# Patient Record
Sex: FEMALE | Race: WHITE | NOT HISPANIC OR LATINO | Employment: PART TIME
[De-identification: names, ages, dates, MRNs, and addresses within clinical notes are randomized per-mention and may not be internally consistent; named-entity substitution may affect disease eponyms.]

---

## 2024-01-17 ENCOUNTER — TRANSCRIBE ORDERS (OUTPATIENT)
Dept: OTHER | Age: 54
End: 2024-01-17

## 2024-01-17 DIAGNOSIS — G60.0 CHARCOT-MARIE-TOOTH DISEASE: Primary | ICD-10-CM

## 2024-03-10 ENCOUNTER — HEALTH MAINTENANCE LETTER (OUTPATIENT)
Age: 54
End: 2024-03-10

## 2024-04-01 ENCOUNTER — PRE VISIT (OUTPATIENT)
Dept: NEUROLOGY | Facility: CLINIC | Age: 54
End: 2024-04-01
Payer: COMMERCIAL

## 2024-04-01 DIAGNOSIS — G60.9 HEREDITARY AND IDIOPATHIC PERIPHERAL NEUROPATHY: Primary | ICD-10-CM

## 2024-04-01 NOTE — TELEPHONE ENCOUNTER
"CMT NEW PATIENT  Do you have any other family members that have been diagnosed with CMT? Yes, two of my daughters  When did your symptoms start? \"They probably started when I was young. But I never knew I had it until my youngest jaci started having issues with her balance. We took her in and that's how I found out I had it. Looking back I was pretty clumsy and couldn't run as a kid\".  Who initially diagnosed you with CMT? [facility] in [place] in 2006. Dr. Anish Marcos  Has genetic testing been completed? If so, do you know what type you have? No, but children are type CMT1A  Do you have hammertoes? yes  Do you have high Arches? \"No, I have flat feet actually\"  Sensory Loss         - Do you have loss of feeling anywhere in your feet or legs? Numbness in bilateral feet         - If so, does the loss of feeling extend above your toes? yes         - Does it extend above the ankle? Not that I've noticed         - Can you identify the point where the sensation becomes normal or nearly normal? At the ankle sensation becomes more normal  - Are these symptoms constant (present all the time), present most of the daytime, less than one-half of the daytime, or just occasionally (Daytime is defined as the time between getting up and going to bed)? Comes and goes but no pattern    Motor Symptoms- Legs  - Do you have weakness in your legs or feet? Yes BLE  - Do you ever trip over your toes/feet or turn or sprain your ankles? \"Yes definitely, but I've become more conscious of it as I've gotten older.\"  - Do your feet slap down on the ground when you walk? yea  - Do you wear shoe inserts/insoles (below the ankle)? no  - Do you wear braces, splints or an equivalent type of orthotic that extends above your ankle? no If so, how are these working for you?   - Have the above ankle orthotics described above ever been prescribed or suggested by a healthcare professional? N/A  - Have you ever had " "surgery on your feet or ankles? Two surgeries on each foot for bunions. Right big toe had bones that were fused together  - If so, do you know if the surgery involved fusion of bones, a transfer of tendons, heel cord lengthening or lowering of the arch? yes  - Do you use a cane, walking stick, or walker to help you walk most of the time outside the home? No. If not, do you feel adaptive equipment would be beneficial? No  - Do you use a wheelchair most of the time because of weakness? No  Motor Symptoms- Arms  - Do you have difficulty with buttoning clothes (standard shirt buttons)? For the most part no, some days are more difficult than others   - If yes, are the difficulties mild or severe (severe includes unable)? mild  - Can you cut most food including meat and pizza with normal utensils? \"Yes, but I have to put more effort into gripping as I've gotten older\"  - Do you have difficulty with activities that require extending or flexing your arms, or activities using your upper arms? no         - Do you have any difficulty with gripping or pinching objects? no         - Do you currently have any hand splints that you wear or have these ever been used in the past (If yes, please ask that they bring these to the appt)? 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 yea  There will also be a  available. Would you be interested in discussing any of the following topics: not at this time   - Initiating the disability process  -Transportation issues  -Financial concerns   - Other community resources  Records needed:   Previous EMG report: Had an EMG done at [facility] in [place] in 2006. Dr. Anish Marcos. She will bring in a copy at her appointment.   Genetic test results if completed: N/A  Office visit notes from previous neurologists " that pertain to CMT: She will bring with her to the appointment   What to expect during your visit:    The CMT clinic takes place every 2nd Tuesday of the month. It is a multi-disciplinary clinic where the pt sees multiple specialists during their visit. The team consists of the neurologist, physical therapist, occupational therapist, orthotist, genetic counselor, , research coordinator and a representative from the [employer] .   If they are scheduled in the morning they will need to arrive between 8-9am. Their departure time will vary but could be as late at 12PM.  If they are scheduled in the afternoon, we will need them to arrive between 12-1pm. Their departure time will vary but could be as late as 5PM.  We will be in touch with you closer to the appt date to update your chart and complete a pre-visit .       RECAP OF SERVICES NEEDED: Research, genetics, PT, orthotics

## 2024-04-09 ENCOUNTER — OFFICE VISIT (OUTPATIENT)
Dept: NEUROLOGY | Facility: CLINIC | Age: 54
End: 2024-04-09
Payer: COMMERCIAL

## 2024-04-09 ENCOUNTER — THERAPY VISIT (OUTPATIENT)
Dept: PHYSICAL THERAPY | Facility: CLINIC | Age: 54
End: 2024-04-09
Attending: FAMILY MEDICINE
Payer: COMMERCIAL

## 2024-04-09 VITALS
HEIGHT: 63 IN | BODY MASS INDEX: 23.92 KG/M2 | HEART RATE: 60 BPM | WEIGHT: 135 LBS | DIASTOLIC BLOOD PRESSURE: 87 MMHG | SYSTOLIC BLOOD PRESSURE: 158 MMHG

## 2024-04-09 DIAGNOSIS — G60.9 HEREDITARY AND IDIOPATHIC PERIPHERAL NEUROPATHY: ICD-10-CM

## 2024-04-09 DIAGNOSIS — Z71.83 ENCOUNTER FOR NONPROCREATIVE GENETIC COUNSELING: ICD-10-CM

## 2024-04-09 DIAGNOSIS — G60.9 HEREDITARY AND IDIOPATHIC PERIPHERAL NEUROPATHY: Primary | ICD-10-CM

## 2024-04-09 DIAGNOSIS — Z86.39 HX OF NON ANEMIC VITAMIN B12 DEFICIENCY: Primary | ICD-10-CM

## 2024-04-09 PROCEDURE — 97116 GAIT TRAINING THERAPY: CPT | Mod: GP | Performed by: PHYSICAL THERAPIST

## 2024-04-09 PROCEDURE — 99204 OFFICE O/P NEW MOD 45 MIN: CPT | Mod: GC | Performed by: PSYCHIATRY & NEUROLOGY

## 2024-04-09 PROCEDURE — 83921 ORGANIC ACID SINGLE QUANT: CPT | Performed by: PSYCHIATRY & NEUROLOGY

## 2024-04-09 PROCEDURE — 99000 SPECIMEN HANDLING OFFICE-LAB: CPT | Performed by: PSYCHIATRY & NEUROLOGY

## 2024-04-09 PROCEDURE — 82607 VITAMIN B-12: CPT | Performed by: PSYCHIATRY & NEUROLOGY

## 2024-04-09 PROCEDURE — 36415 COLL VENOUS BLD VENIPUNCTURE: CPT | Performed by: PSYCHIATRY & NEUROLOGY

## 2024-04-09 PROCEDURE — 97161 PT EVAL LOW COMPLEX 20 MIN: CPT | Mod: GP | Performed by: PHYSICAL THERAPIST

## 2024-04-09 PROCEDURE — 96040 PR GENETIC COUNSELING, EACH 30 MIN: CPT | Performed by: GENETIC COUNSELOR, MS

## 2024-04-09 RX ORDER — CETIRIZINE HYDROCHLORIDE 10 MG/1
1 TABLET ORAL DAILY
COMMUNITY
Start: 2023-06-02

## 2024-04-09 RX ORDER — NORGESTIMATE AND ETHINYL ESTRADIOL 0.25-0.035
1 KIT ORAL DAILY
COMMUNITY

## 2024-04-09 RX ORDER — MULTIVITAMIN WITH IRON
1 TABLET ORAL DAILY
COMMUNITY

## 2024-04-09 RX ORDER — ATENOLOL 100 MG/1
1 TABLET ORAL DAILY
COMMUNITY
Start: 2023-06-02

## 2024-04-09 RX ORDER — NICOTINE POLACRILEX 4 MG/1
GUM, CHEWING ORAL
COMMUNITY

## 2024-04-09 RX ORDER — SENNOSIDES A AND B 8.6 MG/1
1 TABLET, FILM COATED ORAL DAILY PRN
COMMUNITY

## 2024-04-09 NOTE — Clinical Note
Orthotics order revised, ready for review & sign.   Also, per PT patient was complaining of 6/10 neck and R-shoulder pain. PT suggested an ortho consult. If appropriate, would you mind ordering this as well?  Thanks,  Aruna

## 2024-04-09 NOTE — LETTER
2024         RE: Faith Pruett  [address] [facility] [address]        Dear Colleague,    Thank you for referring your patient, Faith Pruett, to the [facility] NEUROLOGY [facility]IC [place]. Please see a copy of my visit note below.    [facility]  Neuromuscular Consultation Note  CMT Certified Center of Excellence    Patient Name:  Faith Pruett  MRN:  [id number]      :  1970  Date of Service:  2024  Primary care provider:  Solange Domínguez      HISTORY OF PRESENT ILLNESS:   Ms. 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 She has a longstanding history of clumsiness but otherwise was functionally capable throughout most of life. She has 2 daughters with poor balance and coordination and had genetic testing showing CMT1A.  She is here to establish care.    She had an EMG several years ago which showed universal very slow conduction velocities. As above, she has 2 daughters that have poor balance and coordination and were tested for hereditary etiologies and were found to have CMT1A. In hindsight, she noticed clumsiness, difficulty in sports, and frequent falls throughout her childhood adult life.  Should be noted, she has 1 daughter that is not clumsy and better at sports than her siblings and does not have CMT.    She currently is noticing numbness in the toes and foot up to this level of the ankles or becomes more normal.  Her hands also fall asleep at night and cause some numbness, but this is inconsistent.  She does report some loss of dexterity of her fingertips with difficulty with buttons, but this is mild.  She is still able to make jewelry and do other fine motor tasks with her hands with only minor complications.  She does endorse some  strength loss over the course of years.  Otherwise she has  ankle instability and foot weakness, but occasionally her knees will also buckle.  She has some minor difficulty with stairs, but she does not avoid them and feels like she can ultimately do them.  She does not need any gait aids or devices at this time.  She is cautious and does not fall.  She has 2 previous surgeries on each of her feet for bunions and fusion of first metatarsal joints with good success.  Finally, she does endorse cramping of the calves and feet at night and has tried some tonic water but has not tried magnesium until just the last few days.  She may want more stronger medication for this and will let us know.      PMH:  No past medical history on file.  No past surgical history on file.    MEDs:   Current Outpatient Medications   Medication Sig Dispense Refill     atenolol (TENORMIN) 100 MG tablet Take 1 tablet by mouth daily       cetirizine (ZYRTEC) 10 MG tablet Take 1 tablet by mouth daily       ferrous sulfate (SLO-FE) 142 (45 Fe) MG CR tablet Take 142 mg by mouth daily       magnesium 250 MG tablet Take 1 tablet by mouth daily 250 Mg       norgestimate-ethinyl estradiol (ORTHO-CYCLEN) 0.25-35 MG-MCG tablet Take 1 tablet by mouth daily       omeprazole 20 MG tablet        senna (SENOKOT) 8.6 MG tablet Take 1 tablet by mouth daily as needed 3 times a day / 1 capsule       No current facility-administered medications for this visit.                  ALLERGIES:    Allergies   Allergen Reactions     Sulfa Antibiotics Hives, Itching and Rash       Social Hx:  Social History     Socioeconomic History     Marital status: Single     Spouse name: Not on file     Number of children: Not on file     Years of education: Not on file     Highest education level: Not on file   Occupational History     Not on file   Tobacco Use     Smoking status: Not on file     Smokeless tobacco: Not on file   Substance and Sexual Activity     Alcohol use: Not on file     Drug use: Not on file     Sexual activity: Not on  "file   Other Topics Concern     Not on file   Social History Narrative     Not on file     Social Determinants of Health     Financial Resource Strain: Not on file   Food Insecurity: Not on file   Transportation Needs: Not on file   Physical Activity: Not on file   Stress: Not on file   Social Connections: Not on file   Interpersonal Safety: Not on file   Housing Stability: Not on file         PFH:  No family history on file.      ROS:  10  point ROS was done as per HPI.       PHYSICAL EXAMINATION:  Vital Signs: Blood pressure (!) 158/87, pulse 60, height 1.6 m (5' 3\"), weight 61.2 kg (135 lb).    MSK:   Flatfeet, plantar-grade, hammertoes mild bilaterally    Neurologic exam:   Mental state: Alert, appropriate; speech, language, and thought content normal.     Cranial nerves: II-XII appropriate and without abnormalities      Sensory:   Right Left   Light touch Normal Normal   Vibration (timed) MM 5-6s Big toes 1-2s   Vibration (Rydell-Seiffer)     Temp     Pin DC DC   Pos Normal big toes Normal big toes   Legend:   MM = medial malleolus, TT = tibial tuberosity, K = patella, MCP = MCP joint  MF = mid-foot, DC = distal calf, MC = mid calf, PC = proximal calf    Motor:  Neck ext and flexion 5/5   Right Left   Shoulder abduction  5 5   Elbow extension 5 5   Elbow flexion 5 5   Wrist extension  5 5   Finger extension 5- 5-   FDI 3 3   APB 4 4   Hip flexion 5 5   Knee flexion 5 5   Knee extension 5 5   Dorsiflexion 4 4+   Plantar flexion 5 5   A=atrophy    Tone: Reduced in the distal extremities     Reflexes:   Right Left   Biceps 1 1   BRD 1 1   Triceps 0 0   Yessica 0 0   Patellar 1 1   Achilles 0 0   Plantar Flexor Flexor   Clonus Absent Absent      Coordination:  Finger-nose normal.  Heel-shin normal.  RRMs normal.    Gait: Slightly wide-based, slightly high steppage, Unable to walk on heels, toes OK, tandem quite difficult          0 1 2 3 4   Sensory symptoms None Below or at ankle bones Symptoms up to the distal " half of the calf Symptoms up to the proximal half of the calf, including knee Symptoms above knee (above the top of the patella)   Motor symptoms - legs None  Trips, catches toes, slaps feet, shoe inserts Ankle support or stabilization needed most of the time for ambulation Walking aids (cane, walker) needed most of the time Wheelchair most of the time   Motor symptoms - arms None Mild difficulty with buttons Severe difficulty or unable to do buttons Unable to cut most foods Proximal weakness (affect movements involving the elbow and above)   Pin sensibility Normal Decreased below or at ankle bones Decreased up to the distal half of the calf Decreased up to the proximal half of the calf, including knee Decreased above knee (above the  top of the patella)   Vibration  Normal Reduced at great toe Reduced at ankle Reduced at knee (tibial tuberosity) Absent at knee and ankle   Strength - legs Normal 4+, 4, or 4- on foot dorsi- or plantarflexion </= 3 on foot dorsi- or plantarflexion </= 3 on foot dorsi- and plantarflexion Proximal weakness   Strength - arms Normal 4+, 4, or 4- on intrinsic hand muscles </= 3 on intrinsic hand muscles < 5 on wrist extensors Weak above elbow   Ulnar CMAP (Median)        Radial SNAP            We personally reviewed and interpreted her prior electrodiagnostic study, which demonstrates a sensorimotor polyneuropathy with conduction velocities in the demyelinating range, with no conduction block.     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 She has a longstanding history of clumsiness but otherwise was functionally capable throughout most of life. She has 2 daughters with poor balance and coordination and had genetic testing showing CMT1A.  In hindsight, she has almost a lifetime of clumsiness, poor coordination, and frequent falls  "as a child and early adult.  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    Falls: Currently denies falls  ADLs: Does not need help with ADLs at this time  Genetic testing: Met with genetic counselor today  Pain: Does have cramping sensations of her feet and calves bilaterally, will try magnesium and we may try mexiletine in the future   MSK/foot: Minor deformities of the feet, on bothersome  CMT education and research: Provided    Patient instructions:  We discussed the diagnosis and management of CMT today, including the following:    Blood tests today for genetic testing and B12 level.  You will receive information from the patient advocacy and education organizations we work with, including the \"neurotoxic medications\" list.   We discussed pre-implantation genetic diagnosis.  You met with other members of our multidisciplinary team to address gait, safety, and foot pain.  If magnesium does not alleviate your cramps and they are severe, we can prescribe mexiletine to take at bedtime.    Follow up 1 year, sooner if needed      Patient was seen and discussed with staff, Dr. Traore.      Ricardo Mccartney MD  Neuromuscular Medicine Fellow, PGY-5  [facility]    I personally examined the patient and concur with the resident's note.     Kristian Traore M.D.         Again, thank you for allowing me to participate in the care of your patient.        Sincerely,        Kristian Traore MD  "

## 2024-04-09 NOTE — Clinical Note
Maximo Traore,   Orthotics order pending. Please review & sign. Also, per PT patient was complaining of 6/10 neck and R-shoulder pain. PT suggested an ortho consult. If appropriate, would you mind ordering this as well?   Thank you in advance,  CRISTELA LariosN RN Care Coordinator Neurology/Neurosurgery/PM&R/Pain Management

## 2024-04-09 NOTE — Clinical Note
2024         RE: Faith Pruett  [address] [address]        Dear Colleague,    Thank you for referring your patient, Faith Pruett, to the [facility] NEUROLOGY CLINIC [place]. Please see a copy of my visit note below.    Faith Pruett was seen for a genetic counseling appointment at the request of Dr. Traore today given her diagnosis of CMT.     Pertinent Medical History: Faith is a 54 year old female with a history of CMT and carpal tunnel syndrome. She was diagnosed on EMG/NCS in  after her daughters began having symptoms of CMT and tested positive for CMT1A. Lashonda has not had genetic testing previously. See Dr. Traore's note for additional details.     Family History: A three generation pedigree was obtained today and scanned into the EMR. This family history is by patient report only and has not been verified with medical records except where noted. The following information is significant:   Lashonda has three daughters. Her daughter (age 28) has CMT1A and has one healthy child. Her daughter (age 25) is alive and well and tested negative for CMT1A. Her daughter (age 23) has CMT1A.  Lashonda has one brother (age 55) who is alive and well. He has two healthy sons and one healthy daughter.   Lashonda's father (age 82) is alive and well. Lashonda has one paternal uncle who had mental health concerns and is . He had one child who  due to cancer and two other children whose health status is unknown. Lashonda's other paternal uncle  due to old age and had one daughter with depression and two other daughters who are alive and well. No information is available regarding Lashonda's paternal grandparents' health. Paternal ancestry is Sudanese.  Lashonda's mother (age 82) is alive and well. Lashonda has one maternal uncle who  due to a heart attack and had one healthy son and one healthy daughter. Lashonda has one maternal uncle (age 89) who is alive and well and has nine  healthy children. Lashonda has one maternal aunt who  due to cancer and had five healthy children. Lashonda has one maternal aunt who is in her 70s, is alive and well and has two healthy sons. No information is available regarding the health history of Lashonda's maternal grandparents. Maternal ancestry is [place]ic and Polish.   Consanguinity was denied.    Discussion: Lashonda and her daughters have a condition called Charcot Maria A Tooth Type 1A or CMT1A. This diagnosis has not been confirmed through genetic testing for Lashonda. CMT type 1 damages the myelin that coats our nerves. CMT type 1 can be diagnosed based on the results of the nerve conduction studies, family history and/or genetic testing. The subtype of CMT identified in the family is type A. This can only be determined through genetic testing. All individuals with CMT1A have a similar genetic change that causes this particular subtype.    CMT1A is this most common type of CMT, encompassing about 55% of genetically defined CMT and 36% of all CMT diagnoses. CMT1A is a peripheral neuropathy that causes damage to the myelin in two types of the nerves, the motor nerves (involved in muscle movement) and the sensory nerves (involved in sensation or feeling). Damage to motor nerves causes muscle weakness and difficulty with muscle movement, especially in the hands and feet. This can lead to difficulty walking, writing, putting on jewelry and many other types of movement. Weakness in the small muscles of the foot can also lead to changes in the structure of the foot such as hammer toes or high/low arches. Damage to the sensory nerves can cause numbness, tingling and impaired balance, which can lead to fatigue.    CMT1A is highly variable within members of the same family. Individuals may not experience any symptoms, these symptoms may be mild, or they might begin as mild and gradually become more severe. For some individuals, signs of neuropathy may be present in  childhood, with about 10% of children experiencing delayed walking. In contrast, about 30% of people with this diagnosis do not experience symptoms until the 3rd decade of life or later. CMT1A does not impact lifespan or intelligence. The symptoms of CMT are generally very slowly progressive and less than 5% of individuals with CMT will need a wheelchair during their lifetime.    Information about genes, chromosomes and inheritance was reviewed. Chromosomes are made of DNA and are the packaging that contain our genes. Genes are the instructions that tell our bodies how to build proteins. These proteins function all over our body to keep us healthy. Typically, everyone has two copies of every gene. They inherit one from their mother one from their father. CMT1A is caused by a duplication in the PMP22 gene. Instead of two copies of the PMP22 gene (one on each chromosome), individuals with CMT1A have three copies of the PMP22 gene (two on one chromosome and one on the other chromosome). This causes the body to produce too much protein which leads to the symptoms we see in CMT1A.    CMT1A is inherited in an autosomal dominant manner. This means that only one extra copy of the PMP22 gene is necessary for an individual to show symptoms of CMT1A. Children get one copy of their chromosome from their mother and one from their father. This means there is a 50% chance that someone with CMT1A will pass on the chromosome containing one copy of PMP22, resulting in a child without CMT1A and there is a 50% chance that they will pass on the chromosome containing two copies of PMP22, resulting in a child with CMT1A.      Lashonda was offered single gene PMP22 testing pending insurance approval or a sponsored, no charge, neuropathy gene panel at [facility]. Risks, benefits, limitations and possible results of these testing options were reviewed. Lashonda expressed an excellent understanding of this information and consented to  the sponsored comprehensive neuropathy panel.    Plan:  1. Sponsored comprehensive neuropathy panel at [facility].  2. Return pending results of above testing  3. Contact information was provided should any questions arise in the future.     Frances Wiggins [facility]  Genetic Counselor  Division of Genetics and Metabolism  (p) [phone]  (f) [phone]     Total time spent in consultation with the family was approximately 25 minutes      Cc: No Letter       Again, thank you for allowing me to participate in the care of your patient.        Sincerely,        Frances Wiggins, GC

## 2024-04-09 NOTE — NURSING NOTE
"Faith Pruett's goals for this visit include:   Chief Complaint   Patient presents with    New Patient     CMT       She requests these members of her care team be copied on today's visit information: yes    PCP: Solange Domínguez    Referring Provider:  No referring provider defined for this encounter.    BP (!) 158/87   Pulse 60   Ht 1.6 m (5' 3\")   Wt 61.2 kg (135 lb)   BMI 23.91 kg/m      Do you need any medication refills at today's visit? No  ANY Vázquez, CMA ([facility])      "

## 2024-04-09 NOTE — PROGRESS NOTES
PHYSICAL THERAPY EVALUATION  Type of Visit: Evaluation    See electronic medical record for Abuse and Falls Screening details.    Subjective       Presenting condition or subjective complaint:  Worsening right shoulder pain  Date of onset: 24    Relevant medical history:   Faith is a 54 year old female with a history of CMT and carpal tunnel syndrome. She was diagnosed on EMG/NCS in  after her daughters began having symptoms of CMT and tested positive for CMT1A. Lashonda has not had genetic testing previously. See Dr. Traore's note for additional details.      Dates & types of surgery:      Prior diagnostic imaging/testing results:     See medical chart  Prior therapy history for the same diagnosis, illness or injury:    Has had PT for right shoulder    Prior Level of Function  Transfers: Independent  Ambulation: Independent  ADL: Independent  IADL: Driving, Housekeeping, Laundry, Meal preparation    Living Environment  Social support:   Lives with boyfriend  Type of home:   2 level home  Stairs to enter the home:       2  Ramp:   0  Stairs inside the home:       flight with rail  Help at home:  Boyfriend  Equipment owned:   None    Employment:    Part time- accounting  Hobbies/Interests:  Walking    Patient goals for therapy:  Be able to exercise without too much fatigue.    Pain assessment: Location: Right shoulder/Ratin/10     Objective   NEURO CLINIC EVALUATION    Others present at visit: None    Cardio-respiratory status: No concerns but will get tired on stairs    Height/Weight: Data Unavailable / 135 lbs 0 oz    Technology used: Smart Phone    ALS FRS:  /48    Evaluation   Interview completed.     Range of motion: WFL; bilateral heel cord tightness       Manual muscle testing:  ankle dorsiflexion=4/5 on Right and 4+/5 on Left     Gait:  Normal heel toe, with foot drop on Right more as she fatigues     Cognition:  No concerns    Recommended Interventions: gait training    Treatment provided this  date:   Gait training, 10 minutes    Response to treatment/recommendations: Tolerated well    Goal attainment:  All goals met    Total Evaluation Time (Minutes): 30  Timed Code Treatment Minutes: 10  Total Treatment Time (sum of timed and untimed services): 40    Assessment & Plan   CLINICAL IMPRESSIONS  Medical Diagnosis: Hereditary and idiopathic peripheral neuropathy ; CMT1A   Treatment Diagnosis: Weakness, impaired balance   Impression/Assessment: Patient is a 54 year old female with weakness and fatigue complaints.  The following significant findings have been identified: Decreased strength, Impaired balance, Impaired gait, and Decreased activity tolerance. These impairments interfere with their ability to perform self care tasks, work tasks, recreational activities, and community mobility as compared to previous level of function.     Clinical Decision Making (Complexity):  Clinical Presentation: Evolving/Changing  Clinical Presentation Rationale: based on medical and personal factors listed in PT evaluation  Clinical Decision Making (Complexity): Low complexity    PLAN OF CARE  Treatment Interventions:  Interventions: Gait Training    Long Term Goals     PT Goal 1  Goal Identifier: Gait  Goal Description: Lashonda will demonstrate improved gait mechanics with right foot brace or orthotic support for ability to walk one mile 3-4x week for aerobic activity.  Rationale: to maximize safety and independence within the community;to maximize safety and independence with performance of ADLs and functional tasks  Target Date: 04/09/24  Date Met: 04/09/24      Frequency of Treatment: 1 time Eval and treat  Duration of Treatment:      Recommended Referrals to Other Professionals:  Follow up for neck and right shoulder pain  Education Assessment:   Learner/Method: Patient;Listening;Demonstration  Education Comments: ROM and exs she can do throughout her day    Risks and benefits of evaluation/treatment have been explained.    Patient/Family/caregiver agrees with Plan of Care.     Evaluation Time:     PT Tiffany, Low Complexity Minutes (94824): 30       Signing Clinician: Lindsay Epperson PT

## 2024-04-09 NOTE — PATIENT INSTRUCTIONS
"We discussed the diagnosis and management of CMT today, including the following:    Blood tests today for genetic testing and B12 level.  You will receive information from the patient advocacy and education organizations we work with, including the \"neurotoxic medications\" list.   We discussed pre-implantation genetic diagnosis.  You met with other members of our multidisciplinary team to address gait, safety, and foot pain.  If magnesium does not alleviate your cramps and they are severe, we can prescribe mexiletine to take at bedtime.  "

## 2024-04-09 NOTE — PROGRESS NOTES
Faith Pruett was seen for a genetic counseling appointment at the request of Dr. Traore today given her diagnosis of CMT.     Pertinent Medical History: Faith is a 54 year old female with a history of CMT and carpal tunnel syndrome. She was diagnosed on EMG/NCS in  after her daughters began having symptoms of CMT and tested positive for CMT1A. Lashonda has not had genetic testing previously. See Dr. Traore's note for additional details.     Family History: A three generation pedigree was obtained today and scanned into the EMR. This family history is by patient report only and has not been verified with medical records except where noted. The following information is significant:   Lashonda has three daughters. Her daughter (age 28) has CMT1A and has one healthy child. Her daughter (age 25) is alive and well and tested negative for CMT1A. Her daughter (age 23) has CMT1A.  Lashonda has one brother (age 55) who is alive and well. He has two healthy sons and one healthy daughter.   Lashonda's father (age 82) is alive and well. Lashonda has one paternal uncle who had mental health concerns and is . He had one child who  due to cancer and two other children whose health status is unknown. Lashonda's other paternal uncle  due to old age and had one daughter with depression and two other daughters who are alive and well. No information is available regarding Lashonda's paternal grandparents' health. Paternal ancestry is [place].  Lashonda's mother (age 82) is alive and well. Lashonda has one maternal uncle who  due to a heart attack and had one healthy son and one healthy daughter. Lashonda has one maternal uncle (age 89) who is alive and well and has nine healthy children. Lashonda has one maternal aunt who  due to cancer and had five healthy children. Lashonda has one maternal aunt who is in her 70s, is alive and well and has two healthy sons. No information is available regarding the health history of Lashonda's  maternal grandparents. Maternal ancestry is [place] and [place].   Consanguinity was denied.    Discussion: Lashonda and her daughters have a condition called Charcot Maria A Tooth Type 1A or CMT1A. This diagnosis has not been confirmed through genetic testing for Lashonda. CMT type 1 damages the myelin that coats our nerves. CMT type 1 can be diagnosed based on the results of the nerve conduction studies, family history and/or genetic testing. The subtype of CMT identified in the family is type A. This can only be determined through genetic testing. All individuals with CMT1A have a similar genetic change that causes this particular subtype.    CMT1A is this most common type of CMT, encompassing about 55% of genetically defined CMT and 36% of all CMT diagnoses. CMT1A is a peripheral neuropathy that causes damage to the myelin in two types of the nerves, the motor nerves (involved in muscle movement) and the sensory nerves (involved in sensation or feeling). Damage to motor nerves causes muscle weakness and difficulty with muscle movement, especially in the hands and feet. This can lead to difficulty walking, writing, putting on jewelry and many other types of movement. Weakness in the small muscles of the foot can also lead to changes in the structure of the foot such as hammer toes or high/low arches. Damage to the sensory nerves can cause numbness, tingling and impaired balance, which can lead to fatigue.    CMT1A is highly variable within members of the same family. Individuals may not experience any symptoms, these symptoms may be mild, or they might begin as mild and gradually become more severe. For some individuals, signs of neuropathy may be present in childhood, with about 10% of children experiencing delayed walking. In contrast, about 30% of people with this diagnosis do not experience symptoms until the 3rd decade of life or later. CMT1A does not impact lifespan or intelligence. The symptoms of CMT are  generally very slowly progressive and less than 5% of individuals with CMT will need a wheelchair during their lifetime.    Information about genes, chromosomes and inheritance was reviewed. Chromosomes are made of DNA and are the packaging that contain our genes. Genes are the instructions that tell our bodies how to build proteins. These proteins function all over our body to keep us healthy. Typically, everyone has two copies of every gene. They inherit one from their mother one from their father. CMT1A is caused by a duplication in the PMP22 gene. Instead of two copies of the PMP22 gene (one on each chromosome), individuals with CMT1A have three copies of the PMP22 gene (two on one chromosome and one on the other chromosome). This causes the body to produce too much protein which leads to the symptoms we see in CMT1A.    CMT1A is inherited in an autosomal dominant manner. This means that only one extra copy of the PMP22 gene is necessary for an individual to show symptoms of CMT1A. Children get one copy of their chromosome from their mother and one from their father. This means there is a 50% chance that someone with CMT1A will pass on the chromosome containing one copy of PMP22, resulting in a child without CMT1A and there is a 50% chance that they will pass on the chromosome containing two copies of PMP22, resulting in a child with CMT1A.      Lashonda was offered single gene PMP22 testing pending insurance approval or a sponsored, no charge, neuropathy gene panel at [facility]. Risks, benefits, limitations and possible results of these testing options were reviewed. Lashonda expressed an excellent understanding of this information and consented to the sponsored comprehensive neuropathy panel.    Plan:  1. Sponsored comprehensive neuropathy panel at [facility].  2. Return pending results of above testing  3. Contact information was provided should any questions arise in the future.     Frances  [facility] [place]etic Counselor  Division of Genetics and Metabolism  (p) [phone]  (f) [phone]     Total time spent in consultation with the family was approximately 25 minutes      Cc: No Letter

## 2024-04-09 NOTE — PROGRESS NOTES
[facility]  Neuromuscular Consultation Note  CMT Certified Center of Excellence    Patient Name:  Faith Pruett  MRN:  [id number]      :  1970  Date of Service:  2024  Primary care provider:  Solange Domínguez      HISTORY OF PRESENT ILLNESS:   Ms. Faith Pruett is a 54 year old woman who presents to the [facility] CMT certified Center of excellence in consultation for presumed CMT1A, genetics pending. She has a longstanding history of clumsiness but otherwise was functionally capable throughout most of life. She has 2 daughters with poor balance and coordination and had genetic testing showing CMT1A.  She is here to establish care.    She had an EMG several years ago which showed universal very slow conduction velocities. As above, she has 2 daughters that have poor balance and coordination and were tested for hereditary etiologies and were found to have CMT1A. In hindsight, she noticed clumsiness, difficulty in sports, and frequent falls throughout her childhood adult life.  Should be noted, she has 1 daughter that is not clumsy and better at sports than her siblings and does not have CMT.    She currently is noticing numbness in the toes and foot up to this level of the ankles or becomes more normal.  Her hands also fall asleep at night and cause some numbness, but this is inconsistent.  She does report some loss of dexterity of her fingertips with difficulty with buttons, but this is mild.  She is still able to make jewelry and do other fine motor tasks with her hands with only minor complications.  She does endorse some  strength loss over the course of years.  Otherwise she has ankle instability and foot weakness, but occasionally her knees will also buckle.  She has some minor difficulty with stairs, but she does not avoid them and feels like she can ultimately do them.  She does not need any gait aids or devices at this time.  She is  cautious and does not fall.  She has 2 previous surgeries on each of her feet for bunions and fusion of first metatarsal joints with good success.  Finally, she does endorse cramping of the calves and feet at night and has tried some tonic water but has not tried magnesium until just the last few days.  She may want more stronger medication for this and will let us know.      PMH:  No past medical history on file.  No past surgical history on file.    MEDs:   Current Outpatient Medications   Medication Sig Dispense Refill    atenolol (TENORMIN) 100 MG tablet Take 1 tablet by mouth daily      cetirizine (ZYRTEC) 10 MG tablet Take 1 tablet by mouth daily      ferrous sulfate (SLO-FE) 142 (45 Fe) MG CR tablet Take 142 mg by mouth daily      magnesium 250 MG tablet Take 1 tablet by mouth daily 250 Mg      norgestimate-ethinyl estradiol (ORTHO-CYCLEN) 0.25-35 MG-MCG tablet Take 1 tablet by mouth daily      omeprazole 20 MG tablet       senna (SENOKOT) 8.6 MG tablet Take 1 tablet by mouth daily as needed 3 times a day / 1 capsule       No current facility-administered medications for this visit.                  ALLERGIES:    Allergies   Allergen Reactions    Sulfa Antibiotics Hives, Itching and Rash       Social Hx:  Social History     Socioeconomic History    Marital status: Single     Spouse name: Not on file    Number of children: Not on file    Years of education: Not on file    Highest education level: Not on file   Occupational History    Not on file   Tobacco Use    Smoking status: Not on file    Smokeless tobacco: Not on file   Substance and Sexual Activity    Alcohol use: Not on file    Drug use: Not on file    Sexual activity: Not on file   Other Topics Concern    Not on file   Social History Narrative    Not on file     Social Determinants of Health     Financial Resource Strain: Not on file   Food Insecurity: Not on file   Transportation Needs: Not on file   Physical Activity: Not on file   Stress: Not on  "file   Social Connections: Not on file   Interpersonal Safety: Not on file   Housing Stability: Not on file         PFH:  No family history on file.      ROS:  10  point ROS was done as per HPI.       PHYSICAL EXAMINATION:  Vital Signs: Blood pressure (!) 158/87, pulse 60, height 1.6 m (5' 3\"), weight 61.2 kg (135 lb).    MSK:   Flatfeet, plantar-grade, hammertoes mild bilaterally    Neurologic exam:   Mental state: Alert, appropriate; speech, language, and thought content normal.     Cranial nerves: II-XII appropriate and without abnormalities      Sensory:   Right Left   Light touch Normal Normal   Vibration (timed) MM 5-6s Big toes 1-2s   Vibration (Rydell-Seiffer)     Temp     Pin DC DC   Pos Normal big toes Normal big toes   Legend:   MM = medial malleolus, TT = tibial tuberosity, K = patella, MCP = MCP joint  MF = mid-foot, DC = distal calf, MC = mid calf, PC = proximal calf    Motor:  Neck ext and flexion 5/5   Right Left   Shoulder abduction  5 5   Elbow extension 5 5   Elbow flexion 5 5   Wrist extension  5 5   Finger extension 5- 5-   FDI 3 3   APB 4 4   Hip flexion 5 5   Knee flexion 5 5   Knee extension 5 5   Dorsiflexion 4 4+   Plantar flexion 5 5   A=atrophy    Tone: Reduced in the distal extremities     Reflexes:   Right Left   Biceps 1 1   BRD 1 1   Triceps 0 0   Yessica 0 0   Patellar 1 1   Achilles 0 0   Plantar Flexor Flexor   Clonus Absent Absent      Coordination:  Finger-nose normal.  Heel-shin normal.  RRMs normal.    Gait: Slightly wide-based, slightly high steppage, Unable to walk on heels, toes OK, tandem quite difficult          0 1 2 3 4   Sensory symptoms None Below or at ankle bones Symptoms up to the distal half of the calf Symptoms up to the proximal half of the calf, including knee Symptoms above knee (above the top of the patella)   Motor symptoms - legs None  Trips, catches toes, slaps feet, shoe inserts Ankle support or stabilization needed most of the time for ambulation Walking " aids (cane, walker) needed most of the time Wheelchair most of the time   Motor symptoms - arms None Mild difficulty with buttons Severe difficulty or unable to do buttons Unable to cut most foods Proximal weakness (affect movements involving the elbow and above)   Pin sensibility Normal Decreased below or at ankle bones Decreased up to the distal half of the calf Decreased up to the proximal half of the calf, including knee Decreased above knee (above the  top of the patella)   Vibration  Normal Reduced at great toe Reduced at ankle Reduced at knee (tibial tuberosity) Absent at knee and ankle   Strength - legs Normal 4+, 4, or 4- on foot dorsi- or plantarflexion </= 3 on foot dorsi- or plantarflexion </= 3 on foot dorsi- and plantarflexion Proximal weakness   Strength - arms Normal 4+, 4, or 4- on intrinsic hand muscles </= 3 on intrinsic hand muscles < 5 on wrist extensors Weak above elbow   Ulnar CMAP (Median)        Radial SNAP            We personally reviewed and interpreted her prior electrodiagnostic study, which demonstrates a sensorimotor polyneuropathy with conduction velocities in the demyelinating range, with no conduction block.     ASSESSMENT/PLAN:  Faith Pruett is a 54 year old woman who presents to the [facility] CMT certified Center of excellence in consultation for presumed CMT1A, genetics pending. She has a longstanding history of clumsiness but otherwise was functionally capable throughout most of life. She has 2 daughters with poor balance and coordination and had genetic testing showing CMT1A.  In hindsight, she has almost a lifetime of clumsiness, poor coordination, and frequent falls as a child and early adult.  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    Falls: Currently denies falls  ADLs: Does not need help with ADLs at this time  Genetic testing: Met with genetic counselor today  Pain: Does have cramping  "sensations of her feet and calves bilaterally, will try magnesium and we may try mexiletine in the future   MSK/foot: Minor deformities of the feet, on bothersome  CMT education and research: Provided    Patient instructions:  We discussed the diagnosis and management of CMT today, including the following:    Blood tests today for genetic testing and B12 level.  You will receive information from the patient advocacy and education organizations we work with, including the \"neurotoxic medications\" list.   We discussed pre-implantation genetic diagnosis.  You met with other members of our multidisciplinary team to address gait, safety, and foot pain.  If magnesium does not alleviate your cramps and they are severe, we can prescribe mexiletine to take at bedtime.    Follow up 1 year, sooner if needed      Patient was seen and discussed with staff, Dr. Traore.      Ricardo Mccartney MD  Neuromuscular Medicine Fellow, PGY-5  [facility]    I personally examined the patient and concur with the resident's note.     Kristian Traore M.D.     "

## 2024-04-10 LAB — VIT B12 SERPL-MCNC: 414 PG/ML (ref 232–1245)

## 2024-04-16 LAB — METHYLMALONATE SERPL-SCNC: 0.19 UMOL/L (ref 0–0.4)

## 2024-04-19 LAB — SCANNED LAB RESULT: ABNORMAL

## 2024-04-22 ENCOUNTER — TELEPHONE (OUTPATIENT)
Dept: NEUROLOGY | Facility: CLINIC | Age: 54
End: 2024-04-22
Payer: COMMERCIAL

## 2024-04-22 NOTE — TELEPHONE ENCOUNTER
Spoke with Lashonda and reviewed the following results for her neuropathy panel genetic testing:    Dear Lashonda,    Thank you for allowing me to be a part of your healthcare at the [facility].  At your visit on 4/9/24 genetic testing related to your history of CMT was pursued. 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 This letter is a brief summary of our discussion and these genetic test results. I have also included a copy of the lab report for your records.    Our genes are sequences of letters that provide instructions that help our body grow, develop and function. We all have changes or variations in our genes that make us unique. Some variations cause the body to be unable to read the instructions. These variations are called pathogenic and can result in a genetic condition. Your genetic testing looked at over 100 genes related to CMT and other neuropathy related conditions to determine if any variants or changes were present.    As we discussed by phone, this test found a disease causing (pathogenic) variant in a gene called PMP22. This variant is a duplication of the full gene, meaning that you have an extra copy of this gene. This confirms your diagnosis of CMT1A. 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 Carriers do not have symptoms of this condition.    Clinical Presentation of CMT1A  CMT1A is this most common type of CMT, encompassing about 55% of genetically defined CMT and 36% of all CMT diagnoses. CMT1A is a peripheral neuropathy that causes damage to the myelin in two types of the nerves, the motor nerves (involved in muscle movement) and the sensory nerves (involved in sensation or feeling). Damage to motor nerves causes muscle weakness and  difficulty with muscle movement, especially in the hands and feet. This can lead to difficulty walking, writing, putting on jewelry and many other types of movement. Weakness in the small muscles of the foot can also lead to changes in the structure of the foot such as hammer toes or high/low arches. Damage to the sensory nerves can cause numbness, tingling and impaired balance, which can lead to fatigue.    CMT1A is highly variable within members of the same family. Individuals may not experience any symptoms, these symptoms may be mild, or they might begin as mild and gradually become more severe. For some individuals, signs of neuropathy may be present in childhood, with about 10% of children experiencing delayed walking. In contrast, about 30% of people with this diagnosis do not experience symptoms until the 3rd decade of life or later. CMT1A does not impact lifespan or intelligence. The symptoms of CMT are generally very slowly progressive and less than 5% of individuals with CMT will need a wheelchair during their lifetime.     Inheritance of CMT1A  Chromosomes are made of DNA and are the packaging that contain our genes. Genes are the instructions that tell our bodies how to build proteins. These proteins function all over our body to keep us healthy. Typically, everyone has two copies of every gene. They inherit one from their mother one from their father. CMT1A is caused by a duplication in the PMP22 gene. Instead of two copies of the PMP22 gene (one on each chromosome), individuals with CMT1A have three copies of the PMP22 gene (two on one chromosome and one on the other chromosome). This causes the body to produce too much protein which leads to the symptoms we see in CMT1A.    CMT1A is inherited in an autosomal dominant manner. This means that only one extra copy of the PMP22 gene is necessary for an individual to show symptoms of CMT1A. Children get one copy of their chromosome from their mother and  one from their father. This means there is a 50% chance that someone with CMT1A will pass on the chromosome containing one copy of PMP22, resulting in a child without CMT1A and there is a 50% chance that they will pass on the chromosome containing two copies of PMP22, resulting in a child with CMT1A.      Based on your genetic test results, your children and other extended family members are at risk for this condition. No charge genetic testing is available through [employer] for 150 days after the report date of 4/19/24. If any of your family members have questions or are interested in genetic testing, they are welcome to contact me at [phone].    Spinal Muscular Atrophy  Individuals with two disease causing variants in their SMN1 genes (one in each copy of the gene) have a condition called spinal muscular atrophy. Because you were only found to have one change in this gene, you are a carrier for this condition. Carriers do not have symptoms of spinal muscular atrophy but can have a child with the condition or who is also a carrier.    Spinal Muscular Atrophy (SMA) is a genetic condition that  affects 1/6,000 to 1/10,000 live births and is characterized by progressive weakness and atrophy of the muscles used for movement. Generally, the muscles closer to the middle of the body (proximal muscles) are affected more than the muscles that are farther away from the body. This muscle weakness can begin at any time from birth to adulthood and may lead to delayed developmental milestones or loss of skills after they have developed.       There is an abundance of information online and through support groups regarding the symptoms and progression for individuals with SMA. However, this information is often only relevant for children and adults who have not received treatment. The prognosis for individuals with SMA has improved greatly with new treatment options. These treatment options work best if given  before someone has symptoms of the condition. For this reason it is important that people who are at risk undergo genetic testing to determine their need for SMA treatment.    Based on your genetic test results, your children and other extended family members are at risk for this condition or to be carriers of this condition. No charge genetic testing is available through [employer] for 150 days after the report date of 4/19/24. If any of your family members have questions or are interested in genetic testing, they are welcome to contact me at [phone].    Next Steps  It is important that you continue to follow in CMT clinic to receive up to date medical management recommendations based on your symptoms. Additionally, because you were found to have CMT1A, you may qualify for participation in research studies. Please contact Dr Traore's office if you would like to participate or learn more about these studies. Any family members who would like to be tested for CMT1A or SMA carrier status are welcome to contact me at [phone].    Thank you again for allowing us to be a part of your care. Please do not hesitate to contact me with additional questions or concerns.    Sincerely,  Frances Wiggins MS [facility]  Genetic Counselor  Division of Genetics and Metabolism  (p) [phone]

## 2024-04-22 NOTE — LETTER
April 23, 2024      TO: Faith HENNING Seble  [address]  [facility] [address]       Dear Lashonda,    Thank you for allowing me to be a part of your healthcare at the [facility].  At your visit on 4/9/24 genetic testing related to your history of CMT was pursued. 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 This letter is a brief summary of our discussion and these genetic test results. I have also included a copy of the lab report for your records.    Our genes are sequences of letters that provide instructions that help our body grow, develop and function. We all have changes or variations in our genes that make us unique. Some variations cause the body to be unable to read the instructions. These variations are called pathogenic and can result in a genetic condition. Your genetic testing looked at over 100 genes related to CMT and other neuropathy related conditions to determine if any variants or changes were present.    As we discussed by phone, this test found a disease causing (pathogenic) variant in a gene called PMP22. This variant is a duplication of the full gene, meaning that you have an extra copy of this gene. This confirms your diagnosis of CMT1A. 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 Carriers do not have symptoms of this condition.    Clinical Presentation of CMT1A  CMT1A is this most common type of CMT, encompassing about 55% of genetically defined CMT and 36% of all CMT diagnoses. CMT1A is a peripheral neuropathy that causes damage to the myelin in two types of the nerves, the motor nerves (involved in muscle movement) and the sensory nerves (involved in sensation or feeling). Damage to motor nerves causes muscle weakness and difficulty  with muscle movement, especially in the hands and feet. This can lead to difficulty walking, writing, putting on jewelry and many other types of movement. Weakness in the small muscles of the foot can also lead to changes in the structure of the foot such as hammer toes or high/low arches. Damage to the sensory nerves can cause numbness, tingling and impaired balance, which can lead to fatigue.    CMT1A is highly variable within members of the same family. Individuals may not experience any symptoms, these symptoms may be mild, or they might begin as mild and gradually become more severe. For some individuals, signs of neuropathy may be present in childhood, with about 10% of children experiencing delayed walking. In contrast, about 30% of people with this diagnosis do not experience symptoms until the 3rd decade of life or later. CMT1A does not impact lifespan or intelligence. The symptoms of CMT are generally very slowly progressive and less than 5% of individuals with CMT will need a wheelchair during their lifetime.     Inheritance of CMT1A  Chromosomes are made of DNA and are the packaging that contain our genes. Genes are the instructions that tell our bodies how to build proteins. These proteins function all over our body to keep us healthy. Typically, everyone has two copies of every gene. They inherit one from their mother one from their father. CMT1A is caused by a duplication in the PMP22 gene. Instead of two copies of the PMP22 gene (one on each chromosome), individuals with CMT1A have three copies of the PMP22 gene (two on one chromosome and one on the other chromosome). This causes the body to produce too much protein which leads to the symptoms we see in CMT1A.    CMT1A is inherited in an autosomal dominant manner. This means that only one extra copy of the PMP22 gene is necessary for an individual to show symptoms of CMT1A. Children get one copy of their chromosome from their mother and one from  their father. This means there is a 50% chance that someone with CMT1A will pass on the chromosome containing one copy of PMP22, resulting in a child without CMT1A and there is a 50% chance that they will pass on the chromosome containing two copies of PMP22, resulting in a child with CMT1A.      Based on your genetic test results, your children and other extended family members are at risk for this condition. No charge genetic testing is available through [employer] for 150 days after the report date of 4/19/24. If any of your family members have questions or are interested in genetic testing, they are welcome to contact me at [phone].    Spinal Muscular Atrophy  Individuals with two disease causing variants in their SMN1 genes (one in each copy of the gene) have a condition called spinal muscular atrophy. Because you were only found to have one change in this gene, you are a carrier for this condition. Carriers do not have symptoms of spinal muscular atrophy but can have a child with the condition or who is also a carrier.    Spinal Muscular Atrophy (SMA) is a genetic condition that  affects 1/6,000 to 1/10,000 live births and is characterized by progressive weakness and atrophy of the muscles used for movement. Generally, the muscles closer to the middle of the body (proximal muscles) are affected more than the muscles that are farther away from the body. This muscle weakness can begin at any time from birth to adulthood and may lead to delayed developmental milestones or loss of skills after they have developed.       There is an abundance of information online and through support groups regarding the symptoms and progression for individuals with SMA. However, this information is often only relevant for children and adults who have not received treatment. The prognosis for individuals with SMA has improved greatly with new treatment options. These treatment options work best if given before  someone has symptoms of the condition. For this reason it is important that people who are at risk undergo genetic testing to determine their need for SMA treatment.    Based on your genetic test results, your children and other extended family members are at risk for this condition or to be carriers of this condition. No charge genetic testing is available through [employer] for 150 days after the report date of 4/19/24. If any of your family members have questions or are interested in genetic testing, they are welcome to contact me at [phone].    Next Steps  It is important that you continue to follow in CMT clinic to receive up to date medical management recommendations based on your symptoms. Additionally, because you were found to have CMT1A, you may qualify for participation in research studies. Please contact Dr Traore's office if you would like to participate or learn more about these studies. Any family members who would like to be tested for CMT1A or SMA carrier status are welcome to contact me at [phone].    Thank you again for allowing us to be a part of your care. Please do not hesitate to contact me with additional questions or concerns.    Sincerely,  Frances Wiggins MS [facility]  Genetic Counselor  Division of Genetics and Metabolism  (p) [phone]

## 2024-07-28 ENCOUNTER — HEALTH MAINTENANCE LETTER (OUTPATIENT)
Age: 54
End: 2024-07-28

## 2025-03-24 ENCOUNTER — PRE VISIT (OUTPATIENT)
Dept: NEUROLOGY | Facility: CLINIC | Age: 55
End: 2025-03-24
Payer: COMMERCIAL

## 2025-03-24 DIAGNOSIS — G60.0 CMT (CHARCOT-MARIE-TOOTH DISEASE): Primary | ICD-10-CM

## 2025-03-24 NOTE — TELEPHONE ENCOUNTER
CMT RETURN PATIENT  Do you have any questions/concerns or new issues you would like to address at your appt? No- everything seems stable since last visit. However, her daughter (diagnosed with CMT1A) is interested in establishing CMT care with Dr. Traore.  How has your mobility been since the last office visit? Seems about the same   -Have you had an increase in falls or any mobility/balance concerns? Balance issues on and off, nothing too consistent. No significant falls.   Do you know what type of CMT you have? CMT1A        -Do you have any questions for the genetic counselor? Not at this time   Sensory Loss  - Do you have loss of feeling or numbness anywhere in your feet or legs? Numbness in bilateral feet   - If so, does the loss of feeling extend above your toes? Yes   - Does it extend above the ankle? Right around the ankle  - Can you identify the point where the sensation becomes normal or nearly normal? Right around the ankle   - Are these symptoms constant (present all the time), present most of the daytime, less than one-half of the daytime, or just occasionally (Daytime is defined as the time between getting up and going to bed)? Comes and goes, worse at night    Motor Symptoms- Legs  - Do you have weakness in your legs or feet? Yes   - Do you ever trip over your toes/feet or turn or sprain your ankles? Not as often, tries to slow down and be more careful walking   - Do your feet slap down on the ground when you walk? Yes  - Do you wear shoe inserts/insoles (below the ankle)? Uses insoles, does not wear them consistently.    - Do you wear braces, splints or an equivalent type of orthotic that extends above your ankle? No If so, how are these working for you? N/a   - Have the above ankle orthotics described above ever been prescribed or suggested by a healthcare professional? No   - Have you ever had surgery on your feet or ankles? Two surgeries on each foot for bunions- right big toe had bones that were  "fused together   - If so, do you know if the surgery involved fusion of bones, a transfer of tendons, heel cord lengthening or lowering of the arch? Yes   - Do you use a cane, walking stick, or walker to help you walk most of the time outside the home? No If not, do you feel adaptive equipment would be beneficial? No   - Do you use a wheelchair most of the time because of weakness? No   Motor Symptoms- Arms  - Do you have difficulty with buttoning clothes (standard shirt buttons)? Sometimes if they are more difficult buttons  - If yes, are the difficulties mild or severe (severe includes unable)? Mild   - Can you cut most food including meat and pizza with normal utensils? Yes- has some difficulty with tougher meat but does not feel this has been a big issue   - Do you have difficulty with activities that require extending or flexing your arms, or activities using your upper arms? Has neck and shoulder issues that make it painful at times to complete activities such as pickleball          -Do you have any difficulty with gripping or pinching object? No          -Do you have any hand splints that you currently wear or have these ever been used in the past (if yes, please ask that they bring these to the appt)? No   We will have a  available in clinic. Would you be interested in discussing any of the following topics: Not at this time    - Initiating the disability process  -Transportation issues  -Financial concerns   - Other community resources  To determine if you qualify for any CMT research studies, would you be interested in meeting with the research coordinator? Not at this time   \"We will contact you once the schedule has been completed to let you know what time you need to arrive. Disregard the automated appointment reminder call, I will call you directly to let you know what time to be here.\"    Recap of services needed: PT   Sara CORTES RN, BSN  [facility]      "

## 2025-08-10 ENCOUNTER — HEALTH MAINTENANCE LETTER (OUTPATIENT)
Age: 55
End: 2025-08-10

## 2025-08-31 ENCOUNTER — HEALTH MAINTENANCE LETTER (OUTPATIENT)
Age: 55
End: 2025-08-31

## 2025-09-04 ENCOUNTER — TELEPHONE (OUTPATIENT)
Dept: NEUROLOGY | Facility: CLINIC | Age: 55
End: 2025-09-04
Payer: COMMERCIAL